# Patient Record
Sex: FEMALE | Race: WHITE | NOT HISPANIC OR LATINO | URBAN - METROPOLITAN AREA
[De-identification: names, ages, dates, MRNs, and addresses within clinical notes are randomized per-mention and may not be internally consistent; named-entity substitution may affect disease eponyms.]

---

## 2022-03-01 ENCOUNTER — OUTPATIENT (OUTPATIENT)
Dept: OUTPATIENT SERVICES | Facility: HOSPITAL | Age: 77
LOS: 1 days | End: 2022-03-01
Payer: MEDICARE

## 2022-03-01 PROCEDURE — 73630 X-RAY EXAM OF FOOT: CPT | Mod: 26,RT

## 2022-03-01 PROCEDURE — 73630 X-RAY EXAM OF FOOT: CPT

## 2022-03-28 ENCOUNTER — OUTPATIENT (OUTPATIENT)
Dept: OUTPATIENT SERVICES | Facility: HOSPITAL | Age: 77
LOS: 1 days | End: 2022-03-28
Payer: MEDICARE

## 2022-03-28 PROCEDURE — 73630 X-RAY EXAM OF FOOT: CPT | Mod: 26,RT

## 2022-03-28 PROCEDURE — 73630 X-RAY EXAM OF FOOT: CPT

## 2022-05-16 ENCOUNTER — OUTPATIENT (OUTPATIENT)
Dept: OUTPATIENT SERVICES | Facility: HOSPITAL | Age: 77
LOS: 1 days | End: 2022-05-16
Payer: MEDICARE

## 2022-05-16 PROCEDURE — 73630 X-RAY EXAM OF FOOT: CPT

## 2022-05-16 PROCEDURE — 73630 X-RAY EXAM OF FOOT: CPT | Mod: 26,RT

## 2022-06-08 RX ORDER — CYCLOPENTOLATE HYDROCHLORIDE 10 MG/ML
1 SOLUTION/ DROPS OPHTHALMIC
Refills: 0 | Status: DISCONTINUED | OUTPATIENT
Start: 2022-06-09 | End: 2022-06-09

## 2022-06-08 NOTE — ASU PATIENT PROFILE, ADULT - FALL HARM RISK - UNIVERSAL INTERVENTIONS
Bed in lowest position, wheels locked, appropriate side rails in place/Call bell, personal items and telephone in reach/Instruct patient to call for assistance before getting out of bed or chair/Non-slip footwear when patient is out of bed/[place] to call system/Physically safe environment - no spills, clutter or unnecessary equipment/Purposeful Proactive Rounding/Room/bathroom lighting operational, light cord in reach

## 2022-06-09 ENCOUNTER — OUTPATIENT (OUTPATIENT)
Dept: OUTPATIENT SERVICES | Facility: HOSPITAL | Age: 77
LOS: 1 days | Discharge: HOME | End: 2022-06-09

## 2022-06-09 VITALS
HEART RATE: 59 BPM | DIASTOLIC BLOOD PRESSURE: 60 MMHG | RESPIRATION RATE: 16 BRPM | OXYGEN SATURATION: 97 % | SYSTOLIC BLOOD PRESSURE: 164 MMHG | TEMPERATURE: 98 F

## 2022-06-09 VITALS
HEIGHT: 65 IN | TEMPERATURE: 98 F | DIASTOLIC BLOOD PRESSURE: 69 MMHG | RESPIRATION RATE: 16 BRPM | HEART RATE: 50 BPM | WEIGHT: 211.64 LBS | SYSTOLIC BLOOD PRESSURE: 168 MMHG

## 2022-06-09 DIAGNOSIS — Z98.890 OTHER SPECIFIED POSTPROCEDURAL STATES: Chronic | ICD-10-CM

## 2022-06-09 DEVICE — LENS IOL TECNIS PROTEC ZCB00 19.0D
Type: IMPLANTABLE DEVICE | Site: RIGHT | Status: NON-FUNCTIONAL
Removed: 2022-06-09

## 2022-06-09 RX ORDER — KETOROLAC TROMETHAMINE 0.5 %
1 DROPS OPHTHALMIC (EYE)
Refills: 0 | Status: COMPLETED | OUTPATIENT
Start: 2022-06-09 | End: 2022-06-09

## 2022-06-09 RX ORDER — PHENYLEPHRINE HCL 2.5 %
1 DROPS OPHTHALMIC (EYE)
Refills: 0 | Status: COMPLETED | OUTPATIENT
Start: 2022-06-09 | End: 2022-06-09

## 2022-06-09 RX ORDER — OFLOXACIN 0.3 %
1 DROPS OPHTHALMIC (EYE)
Refills: 0 | Status: COMPLETED | OUTPATIENT
Start: 2022-06-09 | End: 2022-06-09

## 2022-06-09 RX ORDER — ONDANSETRON 8 MG/1
4 TABLET, FILM COATED ORAL ONCE
Refills: 0 | Status: DISCONTINUED | OUTPATIENT
Start: 2022-06-09 | End: 2022-06-09

## 2022-06-09 RX ORDER — TROPICAMIDE 1 %
1 DROPS OPHTHALMIC (EYE)
Refills: 0 | Status: COMPLETED | OUTPATIENT
Start: 2022-06-09 | End: 2022-06-09

## 2022-06-09 RX ORDER — SODIUM CHLORIDE 9 MG/ML
1000 INJECTION, SOLUTION INTRAVENOUS
Refills: 0 | Status: DISCONTINUED | OUTPATIENT
Start: 2022-06-09 | End: 2022-06-09

## 2022-06-09 RX ORDER — LISINOPRIL 2.5 MG/1
1 TABLET ORAL
Qty: 0 | Refills: 0 | DISCHARGE

## 2022-06-09 RX ADMIN — Medication 1 DROP(S): at 08:15

## 2022-06-09 RX ADMIN — CYCLOPENTOLATE HYDROCHLORIDE 1 DROP(S): 10 SOLUTION/ DROPS OPHTHALMIC at 08:05

## 2022-06-09 RX ADMIN — Medication 1 DROP(S): at 08:10

## 2022-06-09 RX ADMIN — Medication 1 DROP(S): at 08:05

## 2022-06-09 NOTE — OPERATIVE REPORT - OPERATIVE RPOSRT DETAILS
PROCEDURE DATE: 06-09-22    OPERATION:  Phacoemulsification with lens implant, right eye, plus femtolaser plus ORA, right eye.    PREOPERATIVE DIAGNOSES:  Nuclear sclerotic cataract- RIGHT eye    POSTOPERATIVE DIAGNOSES:  Nuclear sclerotic cataract - RIGHT eye    DESCRIPTION OF PROCEDURE:  After appropriate medical clearance and informed consent was obtained, the patient was brought into the operating room in stable condition.  The patient was in the prone position for femtolaser to the right eye.  Section cup placed on the right eye and docked to the laser.  Capsulorrhexis and nuclear fragmentation was performed.  Section was removed and femto procedure was finished.  After this, the patient was brought into the operating room where MAC anesthesia was induced and the patient was prepped and draped in the usual manner for sterile ophthalmic surgery.  A Lindy speculum was placed into the eye and stabilized with two 4x4s.  Topical lidocaine 4% and Ocucoat viscoelastic was instilled over the cornea.  The surgeon sat temporally.  A paracentesis was made and intraocular lidocaine 1% was instilled into the anterior chamber.  Healon GV was then placed into the anterior chamber.    An anterior rhexis capsulotomy was then performed without complication.  Hydrodissection and hydrodelineation of the lens was then performed using BSS on a flat-tip cannula.  Phacoemulsification of the nucleus was then performed by sculpting the nucleus in half.  Healon GV was then placed into the bag and the cracker was used to split the nucleus in half.  Viscoat was then placed into the crack to enlarge the crack and to shield the endothelium.  Phacoemulsification of the 2 halves was then performed without complication.    Mechanical irrigation and aspiration was then performed to remove any remaining cortical material.  Polishing of the anterior and posterior capsules was then performed.  The bag was then inflated using Healon to separate the anterior and posterior leaflets.  A Barraquer tonometer was then used to determine that intraocular pressure of the eye was somewhere between 15 to 20 mmHg.  The ORA machine was then used to determine the correct intraocular lens implant, which was confirmed at 19.0 diopters for an ZCBOO lens.  The ORA machine was turned off and the Implant number [id number], 19.0 diopter was inserted into the bag and rotated into position.  Irrigation and aspiration of the remaining viscoelastic material was then performed with gentle tapping of the lens to ensure that no viscoelastic material was caught behind the lens.  Miochol was inserted into the eye after inserting into the eye and a round pupil was noted at the end of the case.    Careful attention to ensure that there was no wound leak was performed.  A speculum was removed.  A drop each of TobraDex and Timoptic XE was placed into the eye.  A clear shield was then taped over the eye.  The patient returned to the recovery room in stable and improved condition.

## 2022-06-20 PROBLEM — M48.00 SPINAL STENOSIS, SITE UNSPECIFIED: Chronic | Status: ACTIVE | Noted: 2022-06-08

## 2022-06-22 RX ORDER — SODIUM CHLORIDE 9 MG/ML
1000 INJECTION, SOLUTION INTRAVENOUS
Refills: 0 | Status: DISCONTINUED | OUTPATIENT
Start: 2022-06-23 | End: 2022-06-23

## 2022-06-22 NOTE — ASU PATIENT PROFILE, ADULT - NS PREOP UNDERSTANDS INFO
As per MD arrive by 7am, nothing to eat or drink after midnight. Patient reminded to come with photo ID and insurance card, dress in comfortable clothes wit opening to the front, no smoking, illicit drug use or alcohol consumption tonight. No jewelries,, address and call back number given/yes As per MD arrive by 7am, nothing to eat or drink after midnight. Patient reminded to come with photo ID and insurance card, dress in comfortable clothes wit opening to the front, no smoking, illicit drug use or alcohol consumption tonight. No jewelries, Escort must show proof of vaccination and photo ID. Address and call back number given/yes

## 2022-06-22 NOTE — ASU PATIENT PROFILE, ADULT - NS PRO MODE OF ARRIVAL
Progress Notes by Iesha Sharma, PT at 06/09/17 09:06 AM     Author:  Iesha Sharma, PT Service:  (none) Author Type:  Physical Therapist     Filed:  06/09/17 09:54 AM Encounter Date:  6/9/2017 Status:  Signed     :  Iesha Sharma, PT (Physical Therapist)            MEDICARE  PHYSICAL THERAPY[KR1.1C] DAILY SUMMARY[KR1.1M]  DIAGNOSIS:  Left hip pain [M25.552]  - Primary   Weakness, difficulty walking    INSURANCE  BENEFITS: Patient has used $0 towards the Physical Therapy and Occupational Therapy cap in 2017 calendar year    PHYSICIAN RECOMMENDATIONS: Evaluate and Treat     ATTENDANCE: Patient has been seen for[KR1.1C] 2[KR1.1M] visits between 5/31/2017 and[KR1.1C]  6/9/2017[KR1.2T]. Progress summary due on or before 10th visit with G-codes.    SUBJECTIVE:[KR1.1C]  Not too much pain[KR1.3M] in hip, side of knee bugging me a lot[KR1.4M]  OBJECTIVE:     Observation/Posture: arrives with straight cane, forward bent walking, very slow to rise from chair with bilateral arm support    Range of Motion: Active range of motion (*=pain):   Right  5/31/2017  Left  5/31/2017    Hip Flexion heel slide 95 80   Hip Extension -5 supine -10 supine   Hip Abduction 20 5   Hip Adduction NT NT   Hip External Rotation 55 45   Hip Internal Rotation 10 5     Passive range of motion (*=pain):   Right  5/31/2017  Left  5/31/2017    Hip Flexion 100 90*buttock       Manual Muscle Test: Strength per manual muscle test (*=pain):   Right  5/31/2017  Left  5/31/2017    Hip Flexion 4-/5 3-/5   Hip Extension N/T due to pain and age of patient N/T   Hip Abduction 4+/5 3-/5   Hip Adduction N/T N/T   Hip External Rotation 4-/5 3/5   Hip Internal Rotation N/T N/T       Palpation:tenderness at greater trochanter, gluts, tensor fascia mario, piriformis, iliotibial band and vastus lateralis    Joint Mobility: left hip hypomobile all planes    Special Tests: +Migel for rectus femoris and iliopsoas tightness, +Lucy, kasia not attempted  due to severe OA on xray 2016    Functional Assessment:   Gait: forward bent, cane on right, mild unsteadiness without cane  Stairs:NT today  Functional squat:  very slow to rise from chair with bilateral arm support  Balance: feet together eyes open x30, eyes closed x20 increased sway, tandem; unable to attain position with hand hold support    FUNCTIONAL LIMITATIONS REPORTING:  Mobility: Walking & Moving Around -  Current Status -  - CL - 60-79%  Projected Goal -  - CJ - 20-39%    TREATMENT TODAY:    Time in:[KR1.1C] 8:55[KR1.4M] am[KR1.1M]     Time out:[KR1.1C] 9:52[KR1.4M]    Manual Therapy to decrease myofascial tightness:  13121 x[KR1.1C] 2[KR1.4M] units -[KR1.1C]  25[KR1.4M] minutes[KR1.1C]  Myofascial release[KR1.4T] peritrochanter,[KR1.1C] gluts,[KR1.4M] tensor fascia mario[KR1.4T], vastus lateralis,[KR1.4M] iliotibial band, quad[KR1.1C] tendon[KR1.4M] in modified darlin    Therapeutic Exercise to increase range of motion, improve flexibility, improve balance, increase strength and instruct in a home exercise program:  06696 x[KR1.1C] 1[KR1.4M] units -[KR1.1C]  22[KR1.4M] minutes  Modified darlin left 1 min[KR1.1C]; during manual today[KR1.4M]  Bent knee fallouts 3x20 sec[KR1.1C]  New: Bridge with SAQ 0# over roll 10x10 sec  Next:[KR1.4M] bridge peel ups[KR1.1C]  Def[KR1.4M] Standing calf stretch x30 sec each[KR1.1C]  Def[KR1.4M] Standing quad stretch; foot on stool x30 sec bilat  Standing hip abduction x10 bilat[KR1.1C]  Next[KR1.4M] Mini chair squats at rail x10; green band  Ne[KR1.1C]w:[KR1.4M]  Bike 5 min rocking/revolution  Heel/toe raises[KR1.1C] 15x[KR1.4M]  4 inch steps ups x15 bilat in rails[KR1.1C]    Neuromuscular Re-education to[KR1.4T] improve quality of motion, improve proprioception and improve balance[KR1.4M]:  98078 x[KR1.4T] 1[KR1.4M] units -[KR1.4T] 10[KR1.4M] minutes -[KR1.4T]     Tandem stance in rails[KR1.1C] 2x30[KR1.4M]  Stair hip flexor/knee flexion in/out 10x slow bilat;  fingertips rail      Precautions: uses cane, mild unsteadiness    Home exercise program (last updated 5/31/2017): Patient issued written home exercise program.  Patient demonstrated exercises correctly and was instructed to call with questions.   Modified darlin left 1 min  Bent knee fallouts 3x20 sec  Standing calf stretch x30 sec each  Standing quad stretch; foot on stool x30 sec bilat  Standing hip abduction x10 bilat  Mini chair squats at rail x10      ASSESSMENT/TREATMENT RESPONSE:  Patient's response to treatment:[KR1.1C] Patient' main pain complaint along[KR1.4M] iliotibial band[KR1.4T] today with pain at both greater trochanter and lateral knee.  Reports less discomfort following extensive[KR1.4M] Myofascial release[KR1.4T] to the area.  Will benefit from continue soft tissue management[KR1.4M]  Functional improvement noted :Patient[KR1.1C] reports less discomfort with squat following session[KR1.4M]    Prognosis for meeting goals:  good.   Treatment will consist of activities of daily living, gait training, home program, manual therapy, neuromuscular re-education, Physical therapy evaluation, therapeutic activities and therapeutic exercise.  Treatment plan was discussed with the patient and verbal consent was obtained.    Short Term Goals (to be achieved in 2 weeks)  1. Patient will be independent and compliant with home program.  2. Patient will tolerate therapy without increased pain in attempt to avoid surgical intervention.    Long Term Goals (to be achieved in 5 weeks)  1. Patient to be able to walk for 15 minutes safely with her cane and 0-1/10 pain for grocery shopping.   2. Patient will ascend and descend 12 steps x 2 safely with good mechanics and safety to perform stairs in home.  3. Patient will increase active hip range of motion at least 10 deg all planes to allow patient to perform functional tasks like dressing with minimal difficulty.    4. Patient to increase strength of the left hip to at  least 4/5 throughout in order to improve ease of mobility.  5. Patient to perform a functional squat from a standard chair 10 times with good mechanics and good eccentric control, with light use of arms to indicate improved functional strength to get out of chairs.  6. Patient will perform tandem stance at least 30 sec bilaterally to improve steadiness with walking.    PLAN:   Patient will be seen 2 times per week for 5 weeks.     Certification Dates:  Medicare certification signed from: 5/31/2017 to 8/29/17.    Therapist Signature:[KR1.1C]Electronically Signed by:    Iesha Sharma PT , 6/9/2017[KR1.5T]        Revision History        User Key Date/Time User Provider Type Action    > KR1.4 06/09/17 09:54 AM Iesha Sharma, PT Physical Therapist Sign     KR1.3 06/09/17 09:26 AM Iesha Sharma, PT Physical Therapist      KR1.5 06/09/17 09:09 AM Iesha Sharma, PT Physical Therapist      KR1.2 06/09/17 09:07 AM Iesha Sharma, PT Physical Therapist      KR1.1 06/09/17 09:06 AM Iesha Sharma, PT Physical Therapist     C - Copied, M - Manual, T - Template             ambulatory

## 2022-06-22 NOTE — ASU PATIENT PROFILE, ADULT - NSICDXPASTSURGICALHX_GEN_ALL_CORE_FT
PAST SURGICAL HISTORY:  S/P cataract surgery right    S/P knee surgery left    S/P left knee surgery right

## 2022-06-22 NOTE — ASU PATIENT PROFILE, ADULT - NSICDXPASTMEDICALHX_GEN_ALL_CORE_FT
PAST MEDICAL HISTORY:  Spinal stenosis      PAST MEDICAL HISTORY:  Pulmonary embolism H/O x2  2 yrs apart unknown origin F's /U with hematologist and takes xarelto daily  stopped 2 days ago for purpose of todays surgery    Spinal stenosis

## 2022-06-23 ENCOUNTER — OUTPATIENT (OUTPATIENT)
Dept: OUTPATIENT SERVICES | Facility: HOSPITAL | Age: 77
LOS: 1 days | Discharge: HOME | End: 2022-06-23

## 2022-06-23 VITALS
HEART RATE: 54 BPM | SYSTOLIC BLOOD PRESSURE: 114 MMHG | RESPIRATION RATE: 16 BRPM | OXYGEN SATURATION: 97 % | DIASTOLIC BLOOD PRESSURE: 51 MMHG | TEMPERATURE: 98 F

## 2022-06-23 VITALS
SYSTOLIC BLOOD PRESSURE: 145 MMHG | HEART RATE: 51 BPM | OXYGEN SATURATION: 99 % | RESPIRATION RATE: 16 BRPM | TEMPERATURE: 97 F | WEIGHT: 198.86 LBS | DIASTOLIC BLOOD PRESSURE: 50 MMHG | HEIGHT: 65.5 IN

## 2022-06-23 DIAGNOSIS — Z98.890 OTHER SPECIFIED POSTPROCEDURAL STATES: Chronic | ICD-10-CM

## 2022-06-23 DIAGNOSIS — Z98.49 CATARACT EXTRACTION STATUS, UNSPECIFIED EYE: Chronic | ICD-10-CM

## 2022-06-23 DEVICE — LENS IOL TECNIS PROTEC ZCB00 19.0D
Type: IMPLANTABLE DEVICE | Site: LEFT (LEFT EYE) | Status: NON-FUNCTIONAL
Removed: 2022-06-23

## 2022-06-23 RX ORDER — TRAMADOL HYDROCHLORIDE 50 MG/1
0 TABLET ORAL
Qty: 0 | Refills: 0 | DISCHARGE

## 2022-06-23 RX ORDER — KETOROLAC TROMETHAMINE 0.5 %
1 DROPS OPHTHALMIC (EYE)
Refills: 0 | Status: COMPLETED | OUTPATIENT
Start: 2022-06-23 | End: 2022-06-23

## 2022-06-23 RX ORDER — ONDANSETRON 8 MG/1
4 TABLET, FILM COATED ORAL ONCE
Refills: 0 | Status: DISCONTINUED | OUTPATIENT
Start: 2022-06-23 | End: 2022-06-23

## 2022-06-23 RX ORDER — RIVAROXABAN 15 MG-20MG
1 KIT ORAL
Qty: 0 | Refills: 0 | DISCHARGE

## 2022-06-23 RX ORDER — TROPICAMIDE 1 %
1 DROPS OPHTHALMIC (EYE)
Refills: 0 | Status: COMPLETED | OUTPATIENT
Start: 2022-06-23 | End: 2022-06-23

## 2022-06-23 RX ORDER — CHOLECALCIFEROL (VITAMIN D3) 125 MCG
1 CAPSULE ORAL
Qty: 0 | Refills: 0 | DISCHARGE

## 2022-06-23 RX ORDER — PHENYLEPHRINE HCL 2.5 %
1 DROPS OPHTHALMIC (EYE)
Refills: 0 | Status: COMPLETED | OUTPATIENT
Start: 2022-06-23 | End: 2022-06-23

## 2022-06-23 RX ORDER — OFLOXACIN 0.3 %
1 DROPS OPHTHALMIC (EYE)
Refills: 0 | Status: COMPLETED | OUTPATIENT
Start: 2022-06-23 | End: 2022-06-23

## 2022-06-23 RX ORDER — ACETAMINOPHEN 500 MG
650 TABLET ORAL ONCE
Refills: 0 | Status: DISCONTINUED | OUTPATIENT
Start: 2022-06-23 | End: 2022-06-23

## 2022-06-23 RX ORDER — CYCLOPENTOLATE HYDROCHLORIDE 10 MG/ML
1 SOLUTION/ DROPS OPHTHALMIC
Refills: 0 | Status: COMPLETED | OUTPATIENT
Start: 2022-06-23 | End: 2022-06-23

## 2022-06-23 RX ORDER — ZOLPIDEM TARTRATE 10 MG/1
1 TABLET ORAL
Qty: 0 | Refills: 0 | DISCHARGE

## 2022-06-23 RX ORDER — LISINOPRIL/HYDROCHLOROTHIAZIDE 10-12.5 MG
1 TABLET ORAL
Qty: 0 | Refills: 0 | DISCHARGE

## 2022-06-23 RX ADMIN — Medication 1 DROP(S): at 08:27

## 2022-06-23 RX ADMIN — Medication 1 DROP(S): at 08:15

## 2022-06-23 RX ADMIN — CYCLOPENTOLATE HYDROCHLORIDE 1 DROP(S): 10 SOLUTION/ DROPS OPHTHALMIC at 08:15

## 2022-06-23 RX ADMIN — CYCLOPENTOLATE HYDROCHLORIDE 1 DROP(S): 10 SOLUTION/ DROPS OPHTHALMIC at 08:20

## 2022-06-23 RX ADMIN — Medication 1 DROP(S): at 08:20

## 2022-06-23 RX ADMIN — CYCLOPENTOLATE HYDROCHLORIDE 1 DROP(S): 10 SOLUTION/ DROPS OPHTHALMIC at 08:26

## 2022-06-23 NOTE — OPERATIVE REPORT - OPERATIVE RPOSRT DETAILS
PROCEDURE DATE: 06-23-22    OPERATION:  Phacoemulsification with lens implant, LEFT eye, plus femtolaser plus ORA Joycelyn.    PREOPERATIVE DIAGNOSES:  Nuclear sclerotic cataract- LEFT eye    POSTOPERATIVE DIAGNOSES:  Nuclear sclerotic cataract - LEFT eye    DESCRIPTION OF PROCEDURE:  After appropriate medical clearance and informed consent was obtained, the patient was brought into the operating room in stable condition.  The patient was in the prone position for femtolaser to the left eye.  Section cup placed on the left eye and docked to the laser.  Capsulorrhexis and nuclear fragmentation was performed.  Section was removed and femto procedure was finished.  After this, the patient was brought into the operating room where MAC anesthesia was induced and the patient was prepped and draped in the usual manner for sterile ophthalmic surgery.  A Lindy speculum was placed into the eye and stabilized with two 4x4s.  Topical lidocaine 4% and Ocucoat viscoelastic was instilled over the cornea.  The surgeon sat temporally.  A paracentesis was made and intraocular lidocaine 1% was instilled into the anterior chamber.  Healon GV was then placed into the anterior chamber.    An anterior rhexis capsulotomy was then performed without complication.  Hydrodissection and hydrodelineation of the lens was then performed using BSS on a flat-tip cannula.  Phacoemulsification of the nucleus was then performed by sculpting the nucleus in half.  Healon GV was then placed into the bag and the cracker was used to split the nucleus in half.  Viscoat was then placed into the crack to enlarge the crack and to shield the endothelium.  Phacoemulsification of the 2 halves was then performed without complication.    Mechanical irrigation and aspiration was then performed to remove any remaining cortical material.  Polishing of the anterior and posterior capsules was then performed.  The bag was then inflated using Healon to separate the anterior and posterior leaflets.  A Barraquer tonometer was then used to determine that intraocular pressure of the eye was somewhere between 15 to 20 mmHg.  The ORA machine was then used to determine the correct intraocular lens implant, which was confirmed at 19.0 diopters for an ZCBOO lens.  The ORA machine was turned off and the Implant number [id number], 19.0 diopter was inserted into the bag and rotated into position.  Irrigation and aspiration of the remaining viscoelastic material was then performed with gentle tapping of the lens to ensure that no viscoelastic material was caught behind the lens.  Miochol was inserted into the eye after inserting into the eye and a round pupil was noted at the end of the case.    Careful attention to ensure that there was no wound leak was performed.  A speculum was removed.  A drop each of TobraDex and Timoptic XE was placed into the eye.  A clear shield was then taped over the eye.  The patient returned to the recovery room in stable and improved condition.

## 2022-06-23 NOTE — ASU DISCHARGE PLAN (ADULT/PEDIATRIC) - NS MD DC FALL RISK RISK
For information on Fall & Injury Prevention, visit: [email or web address] OR  [email or web address] OR  https://www.cdc.gov/steadi/patient.html

## (undated) DEVICE — MARKING PEN W RULER

## (undated) DEVICE — PACK CENTURION FMS ACT.9 30 BAL

## (undated) DEVICE — DRAPE MICROSCOPE KNOB COVER SMALL (2 PCS)

## (undated) DEVICE — GLV 6.5 PROTEXIS (WHITE)

## (undated) DEVICE — KNIFE ALCON SLIT INTREPID CLEAR-CUT SAFETY 2.4MM

## (undated) DEVICE — SOL IRR BAL SALT 500ML

## (undated) DEVICE — APPLICATOR COTTON TIP 6"

## (undated) DEVICE — SYR LUER LOK 1CC

## (undated) DEVICE — Device

## (undated) DEVICE — NUCLEUS HYDRODISSECTOR PEARCE ANGLED 25G X 22MM

## (undated) DEVICE — TRANSFORMER INTREPID I/A 0.3MM